# Patient Record
Sex: FEMALE | Race: WHITE
[De-identification: names, ages, dates, MRNs, and addresses within clinical notes are randomized per-mention and may not be internally consistent; named-entity substitution may affect disease eponyms.]

---

## 2018-01-31 ENCOUNTER — HOSPITAL ENCOUNTER (OUTPATIENT)
Dept: HOSPITAL 17 - HEEG | Age: 23
End: 2018-01-31
Payer: COMMERCIAL

## 2018-01-31 DIAGNOSIS — F41.9: ICD-10-CM

## 2018-01-31 DIAGNOSIS — R56.9: Primary | ICD-10-CM

## 2018-01-31 DIAGNOSIS — F32.9: ICD-10-CM

## 2018-01-31 DIAGNOSIS — G37.9: ICD-10-CM

## 2018-01-31 PROCEDURE — 95819 EEG AWAKE AND ASLEEP: CPT

## 2018-01-31 NOTE — MG
cc:

TETO ARMAS MD

****

 

 

Sex:  F

 

DATE OF STUDY: 2018

 

EE-[id number]

 

YOB: 1995

 

DESCRIPTION:

5-7 Hz posterior rhythm, 20-50 microvolts, artifact at T3.  Slight increase in

beta frequencies.  Further generalized slowing suggestive of drowsy state.

Vertex waves spindle type activity suggestive of stage I and II sleep.  Reduced

driving with photic stimulation.  Single lead EKG showing sinus rhythm.

Reasonably good EEG variability reactivity.

 

INTERPRETATION

Mild generalized slowing, sleep state, increased faster frequencies likely

psychotropic medication effect.  Clinical correlation.

 

                              _________________________________

                              Teto Armas MD

 

 

 

MG/DEVAUGHN

D:  2018/9:09 PM

T:  2018/9:22 PM

Visit #:  [id number]

Job #:  [id number]